# Patient Record
Sex: MALE | Race: BLACK OR AFRICAN AMERICAN | Employment: UNEMPLOYED | URBAN - METROPOLITAN AREA
[De-identification: names, ages, dates, MRNs, and addresses within clinical notes are randomized per-mention and may not be internally consistent; named-entity substitution may affect disease eponyms.]

---

## 2017-03-29 ENCOUNTER — DOCUMENTATION ONLY (OUTPATIENT)
Dept: CARDIOLOGY CLINIC | Age: 72
End: 2017-03-29

## 2017-03-30 NOTE — PROGRESS NOTES
I was asked by quality metric nurse Mame Heard to re-document about the procedure AICD performed on 9/23/2016. The indication is based on ACC/HRS guideline of appropriate use criteria published in [employer] [address] Volume 10, No 4, April 2013    In summary:  The patient had MI and was not considered candidate for PCI or CABG by Dr Xi Mantilla and Rafaela Dominguez (7/27/2016 and 7/28/2016)  and therefore the \"waiting period is\" >/= 40 days. He had been on toprol and lisinopril for systolic CHF/ischemic cardiomyopathy as much as his BP can tolerate but LVEF remained at 25%, NYHA class 3.     According to the guideline:  Page 10, Table 2.2 and a diagram on page 30 indicated the highest score of 9 for appropriate use criteria of an ICD for this patient as primary prevention of sudden death    Hence he got ICD implanted 9/23/2016